# Patient Record
Sex: FEMALE | URBAN - METROPOLITAN AREA
[De-identification: names, ages, dates, MRNs, and addresses within clinical notes are randomized per-mention and may not be internally consistent; named-entity substitution may affect disease eponyms.]

---

## 2017-01-31 ENCOUNTER — OFFICE VISIT (OUTPATIENT)
Dept: ORTHOPEDIC SURGERY | Age: 42
End: 2017-01-31

## 2017-01-31 VITALS
SYSTOLIC BLOOD PRESSURE: 116 MMHG | TEMPERATURE: 97.5 F | HEART RATE: 66 BPM | BODY MASS INDEX: 31.69 KG/M2 | WEIGHT: 190.2 LBS | HEIGHT: 65 IN | DIASTOLIC BLOOD PRESSURE: 72 MMHG

## 2017-01-31 DIAGNOSIS — G56.23 CUBITAL TUNNEL SYNDROME, BILATERAL: ICD-10-CM

## 2017-01-31 DIAGNOSIS — G56.01 CARPAL TUNNEL SYNDROME, RIGHT: Primary | ICD-10-CM

## 2017-01-31 RX ORDER — ACETAMINOPHEN 325 MG/1
TABLET ORAL
COMMUNITY

## 2017-01-31 RX ORDER — MONTELUKAST SODIUM 10 MG/1
TABLET ORAL
COMMUNITY
Start: 2017-01-11

## 2017-01-31 RX ORDER — CLINDAMYCIN PHOSPHATE 10 MG/ML
SOLUTION TOPICAL
Refills: 11 | COMMUNITY
Start: 2017-01-16

## 2017-01-31 RX ORDER — LAMOTRIGINE 100 MG/1
TABLET, EXTENDED RELEASE ORAL
Refills: 2 | COMMUNITY
Start: 2017-01-17

## 2017-01-31 RX ORDER — INDOMETHACIN 75 MG/1
CAPSULE, EXTENDED RELEASE ORAL
Refills: 6 | COMMUNITY
Start: 2016-11-03

## 2017-01-31 RX ORDER — SIMVASTATIN 40 MG/1
TABLET, FILM COATED ORAL
COMMUNITY
Start: 2017-01-11

## 2017-01-31 RX ORDER — METHYLPREDNISOLONE 4 MG/1
TABLET ORAL
Qty: 1 DOSE PACK | Refills: 0 | Status: SHIPPED | OUTPATIENT
Start: 2017-01-31 | End: 2020-12-01 | Stop reason: ALTCHOICE

## 2017-01-31 NOTE — PROGRESS NOTES
Chief Complaint   Patient presents with    Wrist Pain     Shankar    Elbow Pain     Shankar     0/10 pain.

## 2017-01-31 NOTE — PROGRESS NOTES
Anne Mobley  1975   Chief Complaint   Patient presents with    Wrist Pain     Shankar    Elbow Pain     Shankar        HISTORY OF PRESENT ILLNESS  Anne Mobley is a 35 y.o. female who presents today for evaluation of worsening bilateral wrist and elbow pain, R>L. She was referred for evaluation and treatment by Dr. Vanessa Davis. She states she leans on her left side more, exacerbating the pain and numbness. A previous office note from Dr. Vanessa Davis was reviewed and relieved EMG about one year ago was positive for carpal tunnel. She denies any previous carpal tunnel injections. She wears wrist splints at night. She has increased tingling while driving. Allergies   Allergen Reactions    Codeine Nausea and Vomiting        History reviewed. No pertinent past medical history. Social History     Social History    Marital status: UNKNOWN     Spouse name: N/A    Number of children: N/A    Years of education: N/A     Occupational History    Not on file. Social History Main Topics    Smoking status: Never Smoker    Smokeless tobacco: Never Used    Alcohol use Yes      Comment: socially    Drug use: No    Sexual activity: Not on file     Other Topics Concern    Not on file     Social History Narrative    No narrative on file      History reviewed. No pertinent past surgical history. Family History   Problem Relation Age of Onset    Lupus Mother     Cancer Father       Current Outpatient Prescriptions   Medication Sig    clindamycin phosphate 1 % swab APPLY TO THE AFFECTED AREA 1 TO 2 TIMES DAILY    indomethacin SR (INDOCIN SR) 75 mg SR capsule TK 1 C PO BID    lamoTRIgine (LAMICTAL XR) 100 mg tr24 ER tablet TK 1 T PO IN THE MORNING    montelukast (SINGULAIR) 10 mg tablet     simvastatin (ZOCOR) 40 mg tablet     Cetirizine (ZYRTEC) 10 mg cap Take  by mouth.  acetaminophen (TYLENOL) 325 mg tablet Take  by mouth every four (4) hours as needed for Pain.     methylPREDNISolone (MEDROL, ARNULFO,) 4 mg tablet Per dose pack instructions     No current facility-administered medications for this visit. REVIEW OF SYSTEM   Patient denies: Weight loss, Fever/Chills, HA, Visual changes, Fatigue, Chest pain, SOB, Abdominal pain, N/V/D/C, Blood in stool or urine, Edema. Pertinent positive as above in HPI. All others were negative    PHYSICAL EXAM:   Visit Vitals    /72    Pulse 66    Temp 97.5 °F (36.4 °C) (Oral)    Ht 5' 5\" (1.651 m)    Wt 190 lb 3.2 oz (86.3 kg)    BMI 31.65 kg/m2     The patient is a well-developed, well-nourished female   in no acute distress. The patient is alert and oriented times three. The patient is alert and oriented times three. Mood and affect are normal.  LYMPHATIC: lymph nodes are not enlarged and are within normal limits  SKIN: normal in color and non tender to palpation. There are no bruises or abrasions noted. NEUROLOGICAL: Motor sensory exam is within normal limits. Reflexes are equal bilaterally. There is normal sensation to pinprick and light touch  MUSCULOSKELETAL:  Examination Left Hand Right Hand   Skin Intact Intact   Deformity - -   Swelling - -   Tenderness - -   Tenderness A1 Pulley - -   Finger flexion Full Full   Finger extension Full Full   Thenar Eminence Atrophy - -   Sensation Normal Normal   Capillary refill - -   Heberden's nodes - -   Dupuytren's - -     Examination Left Wrist Right Wrist   Skin Intact Intact   Tenderness - -   Flexion 60 60   Extension 60 60   Deformity - -   Effusion - -   Tinnel's sign - -   Phalen's test - -   Finklestein maneuver - -   Pain with thumb abduction - -       Examination Left Elbow Right Elbow   Skin Intact Intact   Range of Motion 0-135 0-135   Tenderness Medial Epicondyle - -   Tenderness Lateral Epicondyle - -   Tenderness Olecranon Bursa - -   Swelling - -   Bruising - -   Stability Normal Normal   Motor Strength  Normal Normal   Neurovascular Intact Intact       IMPRESSION:      ICD-10-CM ICD-9-CM    1. Carpal tunnel syndrome, right G56.01 354.0 methylPREDNISolone (MEDROL, ARNULFO,) 4 mg tablet   2. Cubital tunnel syndrome, bilateral G56.23 354.2 methylPREDNISolone (MEDROL, ARNULFO,) 4 mg tablet        PLAN: Patient declined carpal tunnel injections today. I will start her on Medrol Dosepak. She will restart indocin following completion of the Medrol Dosepak. She will follow up with Dr. Gely Tran in about three weeks.      Follow-up Disposition: Not on File    Scribed by Aysha Hdz [address]) as dictated by MD Sterling Hess M.D.   [facility]

## 2020-12-01 ENCOUNTER — OFFICE VISIT (OUTPATIENT)
Dept: ORTHOPEDIC SURGERY | Age: 45
End: 2020-12-01
Payer: COMMERCIAL

## 2020-12-01 VITALS
BODY MASS INDEX: 32.49 KG/M2 | SYSTOLIC BLOOD PRESSURE: 121 MMHG | TEMPERATURE: 98.9 F | HEIGHT: 65 IN | RESPIRATION RATE: 15 BRPM | WEIGHT: 195 LBS | DIASTOLIC BLOOD PRESSURE: 86 MMHG | HEART RATE: 83 BPM

## 2020-12-01 DIAGNOSIS — R07.81 RIB PAIN ON RIGHT SIDE: Primary | ICD-10-CM

## 2020-12-01 DIAGNOSIS — M99.08 RIB CAGE REGION SOMATIC DYSFUNCTION: ICD-10-CM

## 2020-12-01 PROCEDURE — 98925 OSTEOPATH MANJ 1-2 REGIONS: CPT | Performed by: FAMILY MEDICINE

## 2020-12-01 PROCEDURE — 99243 OFF/OP CNSLTJ NEW/EST LOW 30: CPT | Performed by: FAMILY MEDICINE

## 2020-12-01 RX ORDER — FLUVOXAMINE MALEATE 100 MG/1
TABLET, COATED ORAL EVERY EVENING
COMMUNITY

## 2020-12-01 RX ORDER — ASENAPINE 3.8 MG/D
FILM, EXTENDED RELEASE TRANSDERMAL
COMMUNITY

## 2020-12-01 NOTE — PATIENT INSTRUCTIONS
Perform stretches daily and return to the office as needed. Side stretch 1. Stand comfortably with your feet shoulder-width apart. 2. Raise one arm over your head, and then lean to the other side. 3. Slide your hand down your leg as you let the weight of your arm gently stretch your side muscles. Hold for 15 to 30 seconds. 4. Repeat 2 to 4 times on each side. ATTENTION: 
Effective 12/15/2020 Dr. Benitez Sharpe will no longer be at [facility] in [place] and will transition completely to his other office near [facility] at [address] [facility]. [address] [place] [address], [facility]. The office phone number is still [phone]. Please reach out via [employer] or telephone for any appointment requests or questions you may have. So sorry for the inconvenience, but we hope to be able to continue providing quality care to you despite the move.

## 2020-12-01 NOTE — PROGRESS NOTES
HISTORY OF PRESENT ILLNESS    Idania Woodward 1975 is a 50y.o. year old female comes in today as new patient to be evaluated and treated at the request of Freddrick Dancer, MD  for my opinion/advice regarding: rib pain    Patients symptoms have been present for 10+ years. Pain level 1/10 right lateral lower flank. It has worsened with arm thrown over her while in bed. Patient has tried:  chiro many sessions w/o lasting benefit. Works as dental hygienist.    Also had eval renal system and found to have renal cancer partial nephrectomy 2012 then splenectomy. IMAGING: XR thoracic 3/9/2020  12 rib-bearing thoracic segments. Normal alignment. Minimal anterior wedging of T8 and T9, secondary to degenerative disc  disease. No compression deformity or spondylolisthesis. No soft tissue abnormality. XR Lumbar 3/9/2020  5 nonrib-bearing lumbar segments. The lumbar vertebral body heights are maintained. No compression fractures  or spondylolisthesis. .   Normal lumbar lordosis. No significant degenerative findings. Visualized bilateral SI joints are grossly intact. No focal soft tissue  abnormality. Past Surgical History:   Procedure Laterality Date    HX NEPHRECTOMY Right     partial    HX OTHER SURGICAL      spleenectomy    HX TONSIL AND ADENOIDECTOMY       Social History     Socioeconomic History    Marital status: UNKNOWN     Spouse name: Not on file    Number of children: Not on file    Years of education: Not on file    Highest education level: Not on file   Tobacco Use    Smoking status: Never Smoker    Smokeless tobacco: Never Used   Substance and Sexual Activity    Alcohol use: Yes     Comment: socially    Drug use: No     Current Outpatient Medications   Medication Sig Dispense Refill    fluvoxaMINE (LUVOX) 100 mg tablet Take  by mouth every evening.  asenapine (Secuado) 3.8 mg/24 hour pt24 by TransDERmal route.       simvastatin (ZOCOR) 40 mg tablet       Cetirizine (ZYRTEC) 10 mg cap Take  by mouth.  acetaminophen (TYLENOL) 325 mg tablet Take  by mouth every four (4) hours as needed for Pain.  clindamycin phosphate 1 % swab APPLY TO THE AFFECTED AREA 1 TO 2 TIMES DAILY  11    indomethacin SR (INDOCIN SR) 75 mg SR capsule TK 1 C PO BID  6    lamoTRIgine (LAMICTAL XR) 100 mg tr24 ER tablet TK 1 T PO IN THE MORNING  2    montelukast (SINGULAIR) 10 mg tablet        Past Medical History:   Diagnosis Date    Cancer ([address]     right kidney (remission)    Depression      Family History   Problem Relation Age of Onset    Lupus Mother     Cancer Father          ROS:  Hx numb, tingle hands (known cubital tunnel and CTS). All other systems reviewed and negative aside from that written in the HPI. Objective:  /86   Pulse 83   Temp 98.9 °F (37.2 °C)   Resp 15   Ht 5' 5\" (1.651 m)   Wt 195 lb (88.5 kg)   BMI 32.45 kg/m²   GEN:  Appears stated age in NAD. HEAD:  Normocephalic, Atraumatic. NEURO:  Sensation intact light touch upper and lower extremities. Biceps & Triceps reflexes +2/4 bilaterally. Patellar and Achilles +2/4  M/S:  Examined seating and supine. Spurling negative bilateral .  Cervical rotation Normal bilateral  No TTA at Tspine bilateral. Rib(s) 11, 12 TTP on right. Strength +5/5 Bilateral upper and lower extremities. EXT  no clubbing/cyanosis. no edema. SKIN: Warm & dry w/o rash. HEENT: Conjunctiva/lids WNL. External canals/nares WNL. Tongue midline. PERRL, EOMI. Hearing intact. NECK: Trachea midline. Supple, Full ROM. No thyromegaly. CARDIAC: No edema. LUNGS: Normal effort. ABD: Soft, NT. No HSM. PSYCH: A+O x3. Appropriate judgment and insight. Assessment/Plan:     ICD-10-CM ICD-9-CM    1. Rib pain on right side  R07.81 786.50    2. Rib cage region somatic dysfunction  M99.08 739.8 MO OSTEOPATHIC MANIP,1-2 BODY REGN     Patient (or guardian if minor) verbalizes understanding of evaluation and plan. Verbal consent obtained.   Rib SD treated with ME and Still's. Correction of previous malalignments verified after Tx. Pt tolerated well. Notes improvement of Sx and pain is now rated 0/10. HEP/stretches daily. Discussed stretching to left as condition 8 years old and ribs 11, 12 want to come together. RTC as needed.

## 2020-12-01 NOTE — LETTER
12/1/20 Patient: Alondra Simpson YOB: 1975 Date of Visit: 12/1/2020 Briseida Quiñonez MD 
[address] [place] [id number] VIA Facsimile: [phone] Dear Briseida Quiñonez MD, Thank you for referring Ms. Alondra Simpson to [place] [address]. for evaluation. My notes for this consultation are attached. If you have questions, please do not hesitate to call me. I look forward to following your patient along with you.  
 
 
Sincerely, 
 
Colt Longs, DO